# Patient Record
Sex: FEMALE
[De-identification: names, ages, dates, MRNs, and addresses within clinical notes are randomized per-mention and may not be internally consistent; named-entity substitution may affect disease eponyms.]

---

## 2018-09-03 ENCOUNTER — HOSPITAL ENCOUNTER (EMERGENCY)
Dept: HOSPITAL 5 - ED | Age: 43
Discharge: HOME | End: 2018-09-03
Payer: MEDICARE

## 2018-09-03 VITALS — DIASTOLIC BLOOD PRESSURE: 50 MMHG | SYSTOLIC BLOOD PRESSURE: 102 MMHG

## 2018-09-03 DIAGNOSIS — Z90.89: ICD-10-CM
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
## 2018-09-03 DIAGNOSIS — F20.9: ICD-10-CM

## 2018-09-03 DIAGNOSIS — Z88.8: ICD-10-CM

## 2018-09-03 DIAGNOSIS — F43.10: ICD-10-CM

## 2018-09-03 DIAGNOSIS — L08.89: ICD-10-CM

## 2018-09-03 DIAGNOSIS — T63.301A: Primary | ICD-10-CM

## 2018-09-03 DIAGNOSIS — F31.9: ICD-10-CM

## 2018-09-03 PROCEDURE — 99282 EMERGENCY DEPT VISIT SF MDM: CPT

## 2018-09-03 NOTE — EMERGENCY DEPARTMENT REPORT
- General


Chief complaint: Wound/Laceration


Stated complaint: SPIDER BITE


Time Seen by Provider: 09/03/18 15:27


Source: patient


Mode of arrival: Ambulatory


Limitations: No Limitations





- History of Present Illness


Initial comments: 





43-year-old female with a past medical history of bipolar disorder, 

schizoaffective disorder, and PTSD presents from [facility] for a wound to her 

distal forearm proximal to the wrist.  Patient states that the wound has been 

there for several weeks initially started out with bite wounds and a small 

lump.  He progressed and increased in size.  Patient has been expressing pus 

from the wound.  She complains of intermittent chills without documented fever.

  She has had intermittent nausea vomiting without any vomiting today.  No 

reports of abdominal pain.  She has been placing adhesive bandages over the 

area which have been partially removing skin when she was change the bandage 

because it does did not hear portion of the finish was not big enough to cover 

the whole wound.  Patient states she has received tetanus 4 years ago.  She has 

not been by a physician since wound has been present.  Patient states she has 

not currently receiving psychiatric treatment and she is cyst renting a room at 

[place] because it is affordable





- Related Data


 Previous Rx's











 Medication  Instructions  Recorded  Last Taken  Type


 


HYDROcodone/APAP 5-325 [[place] 1 each PO ONCE #15 tablet 09/03/18 Unknown Rx





5-325 mg TAB]    


 


Ibuprofen [Motrin 800 MG tab] 800 mg PO ONCE #30 tablet 09/03/18 Unknown Rx


 


Ondansetron [Zofran Odt] 4 mg PO Q8HR PRN #20 tab.rapdis 09/03/18 Unknown Rx


 


Sulfamethoxazole/Trimethoprim 1 each PO ONCE #20 tablet 09/03/18 Unknown Rx





[Bactrim DS TAB]    











 Allergies











Allergy/AdvReac Type Severity Reaction Status Date / Time


 


lamotrigine [From Lamictal] Allergy  Itching Verified 09/03/18 14:16


 


oxcarbazepine Allergy  Itching Verified 09/03/18 14:16





[From Trileptal]     














Abscess Boil HPI





- HPI


Chief Complaint: Wound/Laceration


Stated Complaint: SPIDER BITE


Time Seen by Provider: 09/03/18 15:27


Home Medications: 


 Previous Rx's











 Medication  Instructions  Recorded  Last Taken  Type


 


HYDROcodone/APAP 5-325 [[place] 1 each PO ONCE #15 tablet 09/03/18 Unknown Rx





5-325 mg TAB]    


 


Ibuprofen [Motrin 800 MG tab] 800 mg PO ONCE #30 tablet 09/03/18 Unknown Rx


 


Ondansetron [Zofran Odt] 4 mg PO Q8HR PRN #20 tab.rapdis 09/03/18 Unknown Rx


 


Sulfamethoxazole/Trimethoprim 1 each PO ONCE #20 tablet 09/03/18 Unknown Rx





[Bactrim DS TAB]    











Allergies/Adverse Reactions: 


 Allergies











Allergy/AdvReac Type Severity Reaction Status Date / Time


 


lamotrigine [From Lamictal] Allergy  Itching Verified 09/03/18 14:16


 


oxcarbazepine Allergy  Itching Verified 09/03/18 14:16





[From Trileptal]     














ED Review of Systems


ROS: 


Stated complaint: SPIDER BITE


Other details as noted in HPI





Comment: All other systems reviewed and negative





ED Past Medical Hx





- Past Medical History


Previous Medical History?: Yes


Hx Psychiatric Treatment: Yes (Bi polar, schizo affective disorder, PTSD.)





- Surgical History


Past Surgical History?: Yes


Additional Surgical History: C section, tonsilectomy





- Social History


Smoking Status: Never Smoker


Substance Use Type: None





- Medications


Home Medications: 


 Home Medications











 Medication  Instructions  Recorded  Confirmed  Last Taken  Type


 


HYDROcodone/APAP 5-325 [[place] 1 each PO ONCE #15 tablet 09/03/18  Unknown Rx





5-325 mg TAB]     


 


Ibuprofen [Motrin 800 MG tab] 800 mg PO ONCE #30 tablet 09/03/18  Unknown Rx


 


Ondansetron [Zofran Odt] 4 mg PO Q8HR PRN #20 tab.rapdis 09/03/18  Unknown Rx


 


Sulfamethoxazole/Trimethoprim 1 each PO ONCE #20 tablet 09/03/18  Unknown Rx





[Bactrim DS TAB]     














ED Physical Exam





- General


Limitations: No Limitations





- Skin


Skin exam: Present: cyanosis





- Other


Other exam information: 





General: No limitations, patient is alert in no acute distress


Head exam: Atraumatic, normocephalic


Eyes exam: Normal appearance


ENT: Moist mucous membrane, normal oropharynx


Neck exam: Normal inspection, full range of motion


Respiratory exam: Clear to auscultation bilateral, no wheezes, rales, crackles


Cardiovascular: Normal rate and rhythm, normal heart sounds


Abdomen: Soft, nondistended, and  nontender, with normal bowel sounds, no 

rebound, or guarding


Extremity: Full range of motion normal inspection no deformity


Back: Normal Inspection, full range of motion, no tenderness


Neurologic: Alert, oriented x3, cranial nerves intact, no motor or sensory 

deficit


Psychiatric: normal affect, normal mood


Skin: left distal forearn skin 4cm circular infected superficial wound.  Very 

minimal area of necrosis. mild bloody drainage without pus, erythema at the 

board is due to skin irritation versus cellulitis.  Positive warmth. pt also 

has a sore superficial skin sore to right jaw area





ED Course





 Vital Signs











  09/03/18 09/03/18





  14:10 15:06


 


Temperature 98.8 F 


 


Pulse Rate 87 


 


Respiratory 16 





Rate  


 


Blood Pressure 110/83 102/50


 


O2 Sat by Pulse 98 





Oximetry  














ED Medical Decision Making





- Medical Decision Making





Left forearm wound


No purulent drainage


Wound has been present for several weeks but not healing well


No current nausea or vomiting


Treated with Norco and Bactrim


Will be discharged home with treatment


Even if this is a black  spider bite it occurred several weeks ago and pt 

is not a candidate for antivenom at this time. She currently lacks systemic 

symptoms





- Differential Diagnosis


abscesses, cellulitis, black  bite


Critical Care Time: No


Critical care attestation.: 


If time is entered above; I have spent that time in minutes in the direct care 

of this critically ill patient, excluding procedure time.








ED Disposition


Clinical Impression: 


 Infected bite wound





Disposition: DC-01 TO HOME OR SELFCARE


Is pt being admited?: No


Does the pt Need Aspirin: No


Condition: Stable


Instructions:  Wound Infection (ED)


Additional Instructions: 


Take the medication as prescribed.  Follow up with your doctor or the doctor 

provided.  Return if symptoms worsen as indicated by your discharge instructions


Prescriptions: 


HYDROcodone/APAP 5-325 [Norco 5-325 mg TAB] 1 each PO ONCE #15 tablet


Ibuprofen [Motrin 800 MG tab] 800 mg PO ONCE #30 tablet


Ondansetron [Zofran Odt] 4 mg PO Q8HR PRN #20 tab.rapdis


 PRN Reason: Nausea And Vomiting


Sulfamethoxazole/Trimethoprim [Bactrim DS TAB] 1 each PO ONCE #20 tablet


Referrals: 


[facility] [Provider Group] - 3-5 Days (primary care clinic)


JOÃO AYALA DO [Staff Physician] - 3-5 Days (primary care doctor)


Time of Disposition: 16:16

## 2019-02-20 ENCOUNTER — HOSPITAL ENCOUNTER (INPATIENT)
Dept: HOSPITAL 5 - ED | Age: 44
LOS: 4 days | Discharge: HOME | DRG: 603 | End: 2019-02-24
Attending: INTERNAL MEDICINE | Admitting: INTERNAL MEDICINE
Payer: MEDICARE

## 2019-02-20 DIAGNOSIS — F43.10: ICD-10-CM

## 2019-02-20 DIAGNOSIS — J45.909: ICD-10-CM

## 2019-02-20 DIAGNOSIS — L03.113: Primary | ICD-10-CM

## 2019-02-20 DIAGNOSIS — F25.0: ICD-10-CM

## 2019-02-20 DIAGNOSIS — L88: ICD-10-CM

## 2019-02-20 DIAGNOSIS — F17.200: ICD-10-CM

## 2019-02-20 LAB
ALBUMIN SERPL-MCNC: 3.7 G/DL (ref 3.9–5)
ALT SERPL-CCNC: 11 UNITS/L (ref 7–56)
BASOPHILS # (AUTO): 0 K/MM3 (ref 0–0.1)
BASOPHILS NFR BLD AUTO: 0.3 % (ref 0–1.8)
BUN SERPL-MCNC: 9 MG/DL (ref 7–17)
BUN/CREAT SERPL: 18 %
CALCIUM SERPL-MCNC: 9 MG/DL (ref 8.4–10.2)
EOSINOPHIL # BLD AUTO: 0 K/MM3 (ref 0–0.4)
EOSINOPHIL NFR BLD AUTO: 0.3 % (ref 0–4.3)
HCT VFR BLD CALC: 41.1 % (ref 30.3–42.9)
HEMOLYSIS INDEX: 13
HGB BLD-MCNC: 13.7 GM/DL (ref 10.1–14.3)
LYMPHOCYTES # BLD AUTO: 0.7 K/MM3 (ref 1.2–5.4)
LYMPHOCYTES NFR BLD AUTO: 7.2 % (ref 13.4–35)
MCHC RBC AUTO-ENTMCNC: 34 % (ref 30–34)
MCV RBC AUTO: 95 FL (ref 79–97)
MONOCYTES # (AUTO): 1.1 K/MM3 (ref 0–0.8)
MONOCYTES % (AUTO): 10.8 % (ref 0–7.3)
PLATELET # BLD: 219 K/MM3 (ref 140–440)
RBC # BLD AUTO: 4.35 M/MM3 (ref 3.65–5.03)

## 2019-02-20 PROCEDURE — 86334 IMMUNOFIX E-PHORESIS SERUM: CPT

## 2019-02-20 PROCEDURE — 84166 PROTEIN E-PHORESIS/URINE/CSF: CPT

## 2019-02-20 PROCEDURE — 85652 RBC SED RATE AUTOMATED: CPT

## 2019-02-20 PROCEDURE — 80048 BASIC METABOLIC PNL TOTAL CA: CPT

## 2019-02-20 PROCEDURE — 80053 COMPREHEN METABOLIC PANEL: CPT

## 2019-02-20 PROCEDURE — 80074 ACUTE HEPATITIS PANEL: CPT

## 2019-02-20 PROCEDURE — 82140 ASSAY OF AMMONIA: CPT

## 2019-02-20 PROCEDURE — 85025 COMPLETE CBC W/AUTO DIFF WBC: CPT

## 2019-02-20 PROCEDURE — 87040 BLOOD CULTURE FOR BACTERIA: CPT

## 2019-02-20 PROCEDURE — 86038 ANTINUCLEAR ANTIBODIES: CPT

## 2019-02-20 PROCEDURE — 80307 DRUG TEST PRSMV CHEM ANLYZR: CPT

## 2019-02-20 PROCEDURE — 84703 CHORIONIC GONADOTROPIN ASSAY: CPT

## 2019-02-20 PROCEDURE — 96365 THER/PROPH/DIAG IV INF INIT: CPT

## 2019-02-20 PROCEDURE — 87806 HIV AG W/HIV1&2 ANTB W/OPTIC: CPT

## 2019-02-20 PROCEDURE — 86021 WBC ANTIBODY IDENTIFICATION: CPT

## 2019-02-20 PROCEDURE — 96375 TX/PRO/DX INJ NEW DRUG ADDON: CPT

## 2019-02-20 PROCEDURE — 84165 PROTEIN E-PHORESIS SERUM: CPT

## 2019-02-20 PROCEDURE — 86160 COMPLEMENT ANTIGEN: CPT

## 2019-02-20 PROCEDURE — 36415 COLL VENOUS BLD VENIPUNCTURE: CPT

## 2019-02-20 PROCEDURE — 87116 MYCOBACTERIA CULTURE: CPT

## 2019-02-20 PROCEDURE — 86140 C-REACTIVE PROTEIN: CPT

## 2019-02-20 PROCEDURE — 96376 TX/PRO/DX INJ SAME DRUG ADON: CPT

## 2019-02-20 PROCEDURE — 85613 RUSSELL VIPER VENOM DILUTED: CPT

## 2019-02-20 NOTE — CAT SCAN REPORT
FINAL REPORT



PROCEDURE:  CT UPPER EXTREM RT W CON



TECHNIQUE:  Computerized axial tomography was performed of the RIGHT hand after the IV injection of i
odinated nonionic contrast. 



HISTORY:  Hand swelling 



COMPARISON:  No prior studies are available for comparison.



FINDINGS:  

There is diffuse dorsal subcutaneous edema. No organized fluid collection is seen. No enhancing mass 
is identified. There is limited evaluation of the extensor tendons. No fracture is seen. No focal oss
eous lesions are identified 



IMPRESSION:  

No acute osseous abnormality is seen.



There is diffuse dorsal soft tissue swelling which is nonspecific but could be related to cellulitis.
 No organized fluid collection is seen.

## 2019-02-20 NOTE — XRAY REPORT
XRAY RIGHT HAND THREE VIEWS: 02/20/19 11:38:00





CLINICAL: Trauma and pain.



FINDINGS: The bones and joint spaces are normal.  No fracture or 

dislocation.  Marked soft tissue edema of the dorsum of the hand.  No 

foreign body or soft tissue air.  The carpal bones are intact and the 

distal radius and ulna are intact.



IMPRESSION: Marked soft tissue edema but no fracture or dislocation.

## 2019-02-20 NOTE — HISTORY AND PHYSICAL REPORT
History of Present Illness


Chief complaint: 





My hand is red


History of present illness: 


43 YO Female with Bipolar, Schizoaffective Disorder, Nicotine Dependence 

presents to ED for evaluation. Pt states that she has experienced wounds all 

over her body over the past several years, with worsening symptoms over the past

2 weeks. Pt also reports redness and drainage from the wound on her right hand. 

Pt also reports similar symptoms from the lesions on her right shoulder, right 

forearm, and right eyebrow. Pt transported to [facility] by family for further care and

evaluation. Pt seen and evaluated in ED and found to have R hand Cellulitis, as 

well as suspected Pyoderma Gangrenosum. Pt admitted to medical floor and 

initiated on IV antibiotic therapy. 








Past History


Past Medical History: other (Bipolar, Schizophrenia)


Past Surgical History: , tonsillectomy


Social history: smoking


Family history: no significant family history (reviewed)





Medications and Allergies


                                    Allergies











Allergy/AdvReac Type Severity Reaction Status Date / Time


 


lamotrigine [From Lamictal] Allergy  Itching Verified 18 14:16


 


oxcarbazepine Allergy  Itching Verified 18 14:16





[From Trileptal]     











                                Home Medications











 Medication  Instructions  Recorded  Confirmed  Last Taken  Type


 


No Known Home Medications [No  19 Unknown History





Reported Home Medications]     














Review of Systems


Constitutional: no weight loss, no weight gain, no fever, no chills


Ears, nose, mouth and throat: no ear pain, no ear discharge, no tinnitis, no 

decreased hearing, no nose pain


Breasts: no change in shape, no swelling, no mass


Cardiovascular: no chest pain, no orthopnea, no palpitations, no rapid/irregular

heart beat, no edema


Respiratory: no cough, no cough with sputum, no excessive sputum, no hemoptysis


Gastrointestinal: no abdominal pain, no nausea, no vomiting, no diarrhea


Genitourinary Female: no dysmenorrhea, no pelvic pain, no flank pain, no 

menorrhagia, no dysuria, no urinary frequency


Rectal: no pain, no incontinence, no bleeding


Musculoskeletal: no neck stiffness, no neck pain, no shooting arm pain, no arm 

numbness/tingling, no low back pain


Integumentary: redness, sores, lesions, no jaundice, no boils, no growths, no 

bullae


Neurological: no transient paralysis, no paralysis, no weakness, no parathesias,

no numbness, no tingling


Psychiatric: no anxiety, no memory loss, no change in sleep habits, no sleep 

disturbances, no insomnia


Endocrine: no cold intolerance, no heat intolerance, no polyphagia, no 

polydipsia, no polyuria, no nocturia


Hematologic/Lymphatic: no easy bruising, no easy bleeding


Allergic/Immunologic: no urticaria, no allergic rhinitis, no wheezing





Exam





- Constitutional


Vitals: 


                                        











Temp Pulse Resp BP Pulse Ox


 


 98.8 F   89   19   103/55   98 


 


 19 19:35  19 19:35  19 19:35  19 19:35  19 19:35











General appearance: Present: mild distress





- EENT


Eyes: Present: PERRL


ENT: hearing intact, clear oral mucosa





- Neck


Neck: Present: supple, normal ROM





- Respiratory


Respiratory effort: normal


Respiratory: bilateral: CTA





- Cardiovascular


Heart Sounds: Present: S1 & S2.  Absent: rub, click





- Extremities


Extremities: pulses symmetrical, No edema


Peripheral Pulses: within normal limits





- Abdominal


General gastrointestinal: Present: soft, non-tender, non-distended, normal bowel

sounds


Female genitourinary: Present: normal





- Integumentary


Integumentary: Present: clear, warm, erythema





- Musculoskeletal


Musculoskeletal: gait normal, strength equal bilaterally





- Psychiatric


Psychiatric: appropriate mood/affect, intact judgment & insight





- Neurologic


Neurologic: CNII-XII intact, moves all extremities





Results





- Labs


CBC & Chem 7: 


                                 19 12:02





                                 19 12:02


Labs: 


                              Abnormal lab results











  19 Range/Units





  12:02 12:02 


 


Lymph % (Auto)  7.2 L   (13.4-35.0)  %


 


Mono % (Auto)  10.8 H   (0.0-7.3)  %


 


Lymph #  0.7 L   (1.2-5.4)  K/mm3


 


Mono #  1.1 H   (0.0-0.8)  K/mm3


 


Seg Neutrophils %  81.4 H   (40.0-70.0)  %


 


Seg Neutrophils #  8.1 H   (1.8-7.7)  K/mm3


 


Creatinine   0.5 L  (0.7-1.2)  mg/dL


 


Albumin   3.7 L  (3.9-5)  g/dL














Assessment and Plan





- Patient Problems


(1) Cellulitis of hand, right


Current Visit: Yes   Status: Acute   


Plan to address problem: 


IV antibiotic therapy, Xray right Hand, CT Right hand, wound care consulted. 








(2) Bipolar disorder


Current Visit: Yes   Status: Acute   





(3) Pyoderma gangrenosum


Current Visit: Yes   Status: Acute   


Plan to address problem: 


ESR, CRP, ANCA, hepatitis panel, Serum/urine Protein Electrophoresis, Topical 

Triamcinolone BID








(4) DVT prophylaxis


Current Visit: Yes   Status: Acute   


Plan to address problem: 


SCD to BLE while in bed.

## 2019-02-20 NOTE — EMERGENCY DEPARTMENT REPORT
Addendum entered and electronically signed by PANFILO PRECIADO NP  02/20/19 

12:38: 








Blank Doc





- Documentation


Documentation: 





Patient can go to [facility].  No fever. Normal CBC. 





Original Note:








Blank Doc





- Documentation


Documentation: 





This is a 44-year-old female that presents with right hand cellulitis.  Patient 

stated started with right eyebrow and left arm.  Stated was bitten by spider to 

left arm and now right arm has swelling with cellulitis.  





This initial assessment diagnostic orders/clinical plan/treatment(s) is/are 

subject to change based on patient's health status, clinical progression and 

re-assessment by fellow clinical providers in the ED.  Further treatment and 

workup at subsequent clinical providers discretion.  Patient/guardians urged not

to elope from ED s their condition may be serious if not clinically assessed and

managed.  Initial orders include:


1-Patient sent to Main ED for further evaluation and treatment


2-Labs
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
## 2019-02-21 RX ADMIN — OXYCODONE AND ACETAMINOPHEN PRN TAB: 5; 325 TABLET ORAL at 22:34

## 2019-02-21 RX ADMIN — TRIAMCINOLONE ACETONIDE SCH APPLIC: 1 CREAM TOPICAL at 10:08

## 2019-02-21 RX ADMIN — VANCOMYCIN HYDROCHLORIDE SCH MLS/HR: 1 INJECTION, POWDER, LYOPHILIZED, FOR SOLUTION INTRAVENOUS at 02:10

## 2019-02-21 RX ADMIN — TRIAMCINOLONE ACETONIDE SCH: 1 CREAM TOPICAL at 00:03

## 2019-02-21 RX ADMIN — OXYCODONE AND ACETAMINOPHEN PRN TAB: 5; 325 TABLET ORAL at 00:02

## 2019-02-21 RX ADMIN — Medication SCH ML: at 00:03

## 2019-02-21 RX ADMIN — OXYCODONE AND ACETAMINOPHEN PRN TAB: 5; 325 TABLET ORAL at 17:19

## 2019-02-21 RX ADMIN — OXYCODONE AND ACETAMINOPHEN PRN TAB: 5; 325 TABLET ORAL at 08:40

## 2019-02-21 RX ADMIN — Medication SCH ML: at 10:09

## 2019-02-21 RX ADMIN — VANCOMYCIN HYDROCHLORIDE SCH MLS/HR: 1 INJECTION, POWDER, LYOPHILIZED, FOR SOLUTION INTRAVENOUS at 13:52

## 2019-02-21 NOTE — PROGRESS NOTE
Assessment and Plan


Assessment and plan: 





Right hand cellulitis.  Continue IV antibiotics.  Wound care consultation.  CT 

scan reveals diffuse dorsal subcutaneous edema representative of cellulitis.  No

abscess.  ID consultation.





?  Pyoderma gangrenosum.  Follow-up ESR, CRP, ANCA, hepatitis panel, Serum/urine

Protein Electrophoresis, 





Bipolar disorder.  Continue medications.





DVT prophylaxis. SCD to BLE while in bed. 








History


Interval history: 





45 YO Female with Bipolar, Schizoaffective Disorder, Nicotine Dependence 

[facility] to [facility] with diagnosis of right hand/forearm 

cellulitis.  Pt states that she has experienced wounds all over her body over 

the past several years, with worsening symptoms over the past 2 weeks.


No new issues overnight.





Hospitalist Physical





- Constitutional


Vitals: 


                                        











Temp Pulse Resp BP Pulse Ox


 


 98.0 F   75   18   92/49   98 


 


 02/21/19 06:21  02/21/19 06:21  02/21/19 06:21  02/21/19 06:21  02/21/19 10:24











General appearance: Present: mild distress





- EENT


Eyes: Present: PERRL, EOM intact


ENT: hearing intact, clear oral mucosa, dentition normal





- Neck


Neck: Present: supple, normal ROM





- Respiratory


Respiratory effort: normal


Respiratory: bilateral: CTA





- Cardiovascular


Rhythm: regular


Heart Sounds: Present: S1 & S2.  Absent: gallop, rub





- Extremities


Extremities: no ischemia, Full ROM, abnormal (right forearm erythema with 

quarter size open wound lateral wrist)





- Abdominal


General gastrointestinal: soft, non-tender, non-distended, normal bowel sounds





- Integumentary


Integumentary: Present: clear, warm, dry





- Neurologic


Neurologic: CNII-XII intact, moves all extremities





Results





- Labs


CBC & Chem 7: 


                                 02/20/19 12:02





                                 02/20/19 12:02


Labs: 


                             Laboratory Last Values











WBC  9.9 K/mm3 (4.5-11.0)   02/20/19  12:02    


 


RBC  4.35 M/mm3 (3.65-5.03)   02/20/19  12:02    


 


Hgb  13.7 gm/dl (10.1-14.3)   02/20/19  12:02    


 


Hct  41.1 % (30.3-42.9)   02/20/19  12:02    


 


MCV  95 fl (79-97)   02/20/19  12:02    


 


MCH  32 pg (28-32)   02/20/19  12:02    


 


MCHC  34 % (30-34)   02/20/19  12:02    


 


RDW  13.5 % (13.2-15.2)   02/20/19  12:02    


 


Plt Count  219 K/mm3 (140-440)   02/20/19  12:02    


 


Lymph % (Auto)  7.2 % (13.4-35.0)  L  02/20/19  12:02    


 


Mono % (Auto)  10.8 % (0.0-7.3)  H  02/20/19  12:02    


 


Eos % (Auto)  0.3 % (0.0-4.3)   02/20/19  12:02    


 


Baso % (Auto)  0.3 % (0.0-1.8)   02/20/19  12:02    


 


Lymph #  0.7 K/mm3 (1.2-5.4)  L  02/20/19  12:02    


 


Mono #  1.1 K/mm3 (0.0-0.8)  H  02/20/19  12:02    


 


Eos #  0.0 K/mm3 (0.0-0.4)   02/20/19  12:02    


 


Baso #  0.0 K/mm3 (0.0-0.1)   02/20/19  12:02    


 


Seg Neutrophils %  81.4 % (40.0-70.0)  H  02/20/19  12:02    


 


Seg Neutrophils #  8.1 K/mm3 (1.8-7.7)  H  02/20/19  12:02    


 


ESR  38 mm/Hr (0-20)   02/20/19  21:26    


 


Sodium  137 mmol/L (137-145)   02/20/19  12:02    


 


Potassium  3.6 mmol/L (3.6-5.0)   02/20/19  12:02    


 


Chloride  98.9 mmol/L ()   02/20/19  12:02    


 


Carbon Dioxide  26 mmol/L (22-30)   02/20/19  12:02    


 


Anion Gap  16 mmol/L  02/20/19  12:02    


 


BUN  9 mg/dL (7-17)   02/20/19  12:02    


 


Creatinine  0.5 mg/dL (0.7-1.2)  L  02/20/19  12:02    


 


Estimated GFR  > 60 ml/min  02/20/19  12:02    


 


BUN/Creatinine Ratio  18 %  02/20/19  12:02    


 


Glucose  93 mg/dL ()   02/20/19  12:02    


 


Lactic Acid  1.30 mmol/L (0.7-2.0)   02/20/19  13:46    


 


Calcium  9.0 mg/dL (8.4-10.2)   02/20/19  12:02    


 


Total Bilirubin  0.80 mg/dL (0.1-1.2)   02/20/19  12:02    


 


AST  13 units/L (5-40)   02/20/19  12:02    


 


ALT  11 units/L (7-56)   02/20/19  12:02    


 


Alkaline Phosphatase  68 units/L ()   02/20/19  12:02    


 


C-Reactive Protein  14.90 mg/dL (0.00-1.30)  H  02/20/19  21:35    


 


Total Protein  7.1 g/dL (6.3-8.2)   02/20/19  12:02    


 


Albumin  3.7 g/dL (3.9-5)  L  02/20/19  12:02    


 


Albumin/Globulin Ratio  1.1 %  02/20/19  12:02    


 


HCG, Qual  Negative  (Negative)   02/20/19  12:02    


 


Hepatitis A IgM Ab  Non-reactive  (NonReactive)   02/20/19  21:34    


 


Hep Bs Antigen  Non-reactive  (Negative)   02/20/19  21:34    


 


Hep B Core IgM Ab  Non-reactive  (NonReactive)   02/20/19  21:34    


 


Hepatitis C Antibody  Non-reactive  (NonReactive)   02/20/19  21:34

## 2019-02-21 NOTE — CONSULTATION
History of Present Illness





- Reason for Consult


Consult date: 19


Right hand cellulitis, ?pyoderma


Requesting physician: YAIR RITTER





- History of Present Illness


The patient is a 44-year-old female with schizoaffective disorder, bipolar 

disorder who presented to the emergency room yesterday with complaints of 

multiple wounds including right hand swelling and pain. About 2 weeks ago, she 

developed a wound around her right eyebrow apparently some drainage, she a

ttributes this to her shaving her eyebrows, then later, she developed redness, 

pain and swelling of her right hand that progressively got worse and was 

associated with some fevers and chills and night sweats. She also reports 

additional wounds including her left forearm following a similar timeline. 

Apparently, she had a spider bite to her left forearm several months ago 

resulting in a wound that but had completely healed. Otherwise she denies any 

other systemic complaints. She received a dose of Zosyn and vancomycin in the 

emergency room and now has been continued on vancomycin. Infectious diseases was

consulted for additional recommendations.





She is disabled. Denies any smoking or IV drug use. He is smoked some marijuana 

in the past. Denies any alcohol use. She denies any physical abuse or trauma. 

She previously used to [place] [facility], and about 4 days ago she moved to a town 

house where she is renting one room. Apparently, she's had spider bites before 

and has seen cockroaches and bedbugs in the hotel where she used to live.





Review of Systems: 


General: no fevers,chills or rigors here


HEENT: no new visual disturbance


Respiratory: No cough, sputum, hemoptysis or shortness of breath


Cardiovascular: No chest pain, syncope


Gastrointestinal: No nausea, vomiting or diarrhea


Genitourinary: No dysuria or hematuria


Musculoskeletal: No new or worsening neck pain or back pain 


Neurologic: No headaches, seizures


Hematologic: No easy bruising or bleeding


Endocrine: No night sweats or acute weight loss


Skin: negative for rash, jaundice


Psychiatric: No suicidal or homicidal ideation








Past History


Past Medical History: other (Bipolar, Schizophrenia)


Past Surgical History: , tonsillectomy


Social history: smoking


Family history: no significant family history (reviewed)





Medications and Allergies


                                    Allergies











Allergy/AdvReac Type Severity Reaction Status Date / Time


 


lamotrigine [From Lamictal] Allergy  Itching Verified 18 14:16


 


oxcarbazepine Allergy  Itching Verified 18 14:16





[From Trileptal]     











                                Home Medications











 Medication  Instructions  Recorded  Confirmed  Last Taken  Type


 


No Known Home Medications [No  19 Unknown History





Reported Home Medications]     











Active Meds: 


Active Medications





Acetaminophen (Tylenol)  650 mg PO Q4H PRN


   PRN Reason: Pain MILD(1-3)/Fever >100.5/HA


Vancomycin HCl (Vancomycin/Ns 1 Gm/250 Ml)  1 gm in 250 mls @ 166.667 mls/hr IV 

Q12H [facility]


   Last Admin: 19 13:52 Dose:  166.667 mls/hr


   Documented by: 


Ondansetron HCl (Zofran)  4 mg IV Q8H PRN


   PRN Reason: Nausea And Vomiting


Oxycodone/Acetaminophen (Percocet 5/325)  1 tab PO Q4H PRN


   PRN Reason: Pain, Moderate (4-6)


   Last Admin: 19 08:40 Dose:  1 tab


   Documented by: 


Sodium Chloride (Sodium Chloride Flush Syringe 10 Ml)  10 ml IV BID [facility]


   Last Admin: 19 10:09 Dose:  10 ml


   Documented by: 


Sodium Chloride (Sodium Chloride Flush Syringe 10 Ml)  10 ml IV PRN PRN


   PRN Reason: LINE FLUSH


Triamcinolone Acetonide (Kenalog)  1 applic TP BID [facility]


   Last Admin: 19 10:08 Dose:  1 applic


   Documented by: 











Physical Examination





- Physical Exam


Narrative exam: 








Physical Exam: 


Constitutional: Alert, cooperative. No acute distress


Head, Ears, Nose: Normocephalic, atraumatic. External ears, nose normal


Eyes: Conjunctivae/corneas clear. No icterus. No ptosis.


Neck: Supple, no meningeal signs


Oral: dentition fair, no thrush


Cardiovascular: S1, S2 normal. 


Respiratory: Good air entry, clear to auscultation bilaterally


GI: Soft, non-tender; bowel sounds normal. No peritoneal signs


Musculoskeletal: No pedal edema, no cyanosis.


Skin: Right eyebrow about 1.5 cm superficial wound without any purulence or 

drainage. There is no warmth or tenderness. Right arm with superficial abrasion 

on again without purulence or drainage or acute inflammation. Similar lesion 

also on the right shoulder blade area. Left forearm with superficial wound with 

clean base with no surrounding redness or tenderness. Right hand dorsal surface 

with a wound with a central eschar and surrounding redness, warmth and 

tenderness. No active drainage or purulence, no areas of fluctuation.


Hem/Lymphatic: No palpable cervical or supraclavicular nodes. No lymphangitis


Psych: Mood ok. Affect normal


Neurological: Awake, alert, oriented. No gross abnormality





- Constitutional


Vitals: 


                                   Vital Signs











Temp Pulse Resp BP Pulse Ox


 


 99.3 F   80   18   90/50   97 


 


 19 12:50  19 12:50  19 12:50  19 12:50  19 12:50








                           Temperature -Last 24 Hours











Temperature                    99.3 F


 


Temperature                    98.0 F


 


Temperature                    98.6 F


 


Temperature                    98.8 F


 


Temperature                    98.7 F

















Results





- Labs


CBC & Chem 7: 


                                 19 12:02





                                 19 12:02


Labs: 


                              Abnormal lab results











  19 Range/Units





  21:35 


 


C-Reactive Protein  14.90 H  (0.00-1.30)  mg/dL








Imaging: personally reviewed:


CT of the right hand with IV contrast showed soft tissue swelling consistent 

with cellulitis, no underlying abscess or bony abnormality.








Assessment and Plan








Cultures:


2019 blood culture: No growth





A/P:


44-year-old female with schizoaffective disorder, bipolar disorder admitted 

with:





1) Right hand cellulitis: Right hand does appear warm, swollen and tender and 

seems to be responding to IV antibiotics. Continue IV vancomycin for now.





2) Multiple other wounds involving right eyebrow, right arm, right shoulder 

blade, left forearm: These are not warm, tender. There is no purulence or 

drainage. These do not appear to be infected. Right eyebrow wound seemingly was 

associated with eyebrow shaving hence raises the concern for possible pathergy 

which is associated with pyoderma. Agree with checking for vasculitis and 

autoimmune etiology. Also check hepatitis B and C. Urine tox screen ordered. 

Continue wound care.





Recs:


- Continue IV Vancomycin, target trough: 10-20 mcg/ml


- f/u hepatitis panel, vasculitic panel


- urinary tox screen ordered


- continue wound care


- HIV ordered





[facility] 


C: [phone]


O: [phone]


F: [phone]

## 2019-02-22 LAB
BASOPHILS # (AUTO): 0 K/MM3 (ref 0–0.1)
BASOPHILS NFR BLD AUTO: 1.1 % (ref 0–1.8)
BENZODIAZEPINES SCREEN,URINE: (no result)
BUN SERPL-MCNC: 9 MG/DL (ref 7–17)
BUN/CREAT SERPL: 23 %
CALCIUM SERPL-MCNC: 7.8 MG/DL (ref 8.4–10.2)
EOSINOPHIL # BLD AUTO: 0.1 K/MM3 (ref 0–0.4)
EOSINOPHIL NFR BLD AUTO: 3.4 % (ref 0–4.3)
HCT VFR BLD CALC: 31.5 % (ref 30.3–42.9)
HEMOLYSIS INDEX: 3
HGB BLD-MCNC: 10.9 GM/DL (ref 10.1–14.3)
LYMPHOCYTES # BLD AUTO: 1.2 K/MM3 (ref 1.2–5.4)
LYMPHOCYTES NFR BLD AUTO: 31.4 % (ref 13.4–35)
MCHC RBC AUTO-ENTMCNC: 35 % (ref 30–34)
MCV RBC AUTO: 93 FL (ref 79–97)
METHADONE SCREEN,URINE: (no result)
MONOCYTES # (AUTO): 0.4 K/MM3 (ref 0–0.8)
MONOCYTES % (AUTO): 11.8 % (ref 0–7.3)
OPIATE SCREEN,URINE: (no result)
PLATELET # BLD: 207 K/MM3 (ref 140–440)
RBC # BLD AUTO: 3.38 M/MM3 (ref 3.65–5.03)

## 2019-02-22 RX ADMIN — OXYCODONE AND ACETAMINOPHEN PRN TAB: 5; 325 TABLET ORAL at 13:30

## 2019-02-22 RX ADMIN — VANCOMYCIN HYDROCHLORIDE SCH MLS/HR: 1 INJECTION, POWDER, LYOPHILIZED, FOR SOLUTION INTRAVENOUS at 03:47

## 2019-02-22 RX ADMIN — TRIAMCINOLONE ACETONIDE SCH: 1 CREAM TOPICAL at 21:38

## 2019-02-22 RX ADMIN — OXYCODONE AND ACETAMINOPHEN PRN TAB: 5; 325 TABLET ORAL at 21:34

## 2019-02-22 RX ADMIN — VANCOMYCIN HYDROCHLORIDE SCH MLS/HR: 1 INJECTION, POWDER, LYOPHILIZED, FOR SOLUTION INTRAVENOUS at 13:31

## 2019-02-22 RX ADMIN — Medication SCH ML: at 21:33

## 2019-02-22 RX ADMIN — Medication SCH: at 00:27

## 2019-02-22 RX ADMIN — TRIAMCINOLONE ACETONIDE SCH: 1 CREAM TOPICAL at 10:03

## 2019-02-22 RX ADMIN — TRIAMCINOLONE ACETONIDE SCH: 1 CREAM TOPICAL at 00:27

## 2019-02-22 RX ADMIN — Medication SCH ML: at 10:04

## 2019-02-22 RX ADMIN — OXYCODONE AND ACETAMINOPHEN PRN TAB: 5; 325 TABLET ORAL at 04:59

## 2019-02-22 NOTE — PROGRESS NOTE
Assessment and Plan


Assessment and plan: 





Right hand cellulitis.  Continue IV antibiotics.  Wound care.  CT scan reveals 

diffuse dorsal subcutaneous edema representative of cellulitis.  No abscess.  ID

following.





?  Pyoderma gangrenosum.  Follow-up ESR, CRP, ANCA, hepatitis panel, Serum/urine

Protein Electrophoresis, Check Hep B and C





Bipolar disorder.  Continue medications.





DVT prophylaxis. SCD to BLE while in bed. 








History


Interval history: 





45 YO Female with Bipolar, Schizoaffective Disorder, Nicotine Dependence 

[facility] to [facility] with diagnosis of right hand/forearm cellulitis

.  Pt states that she has experienced wounds all over her body over the past 

several years, with worsening symptoms over the past 2 weeks.


No new issues overnight.





Hospitalist Physical





- Constitutional


Vitals: 


                                        











Temp Pulse Resp BP Pulse Ox


 


 97.6 F   71   16   113/55   98 


 


 02/22/19 12:01  02/22/19 12:01  02/22/19 12:01  02/22/19 12:01  02/22/19 12:01











General appearance: Present: mild distress





- EENT


Eyes: Present: PERRL, EOM intact


ENT: hearing intact, clear oral mucosa, dentition normal





- Neck


Neck: Present: supple, normal ROM





- Respiratory


Respiratory effort: normal


Respiratory: bilateral: CTA





- Cardiovascular


Rhythm: regular


Heart Sounds: Present: S1 & S2.  Absent: gallop, rub





- Extremities


Extremities: no ischemia, No edema, Full ROM





- Abdominal


General gastrointestinal: soft, non-tender, non-distended, normal bowel sounds





- Integumentary


Integumentary: Present: clear, warm, dry





- Neurologic


Neurologic: CNII-XII intact, moves all extremities





Results





- Labs


CBC & Chem 7: 


                                 02/22/19 05:05





                                 02/22/19 05:05


Labs: 


                             Laboratory Last Values











WBC  3.7 K/mm3 (4.5-11.0)  L  02/22/19  05:05    


 


RBC  3.38 M/mm3 (3.65-5.03)  L  02/22/19  05:05    


 


Hgb  10.9 gm/dl (10.1-14.3)   02/22/19  05:05    


 


Hct  31.5 % (30.3-42.9)  D 02/22/19  05:05    


 


MCV  93 fl (79-97)   02/22/19  05:05    


 


MCH  32 pg (28-32)   02/22/19  05:05    


 


MCHC  35 % (30-34)  H  02/22/19  05:05    


 


RDW  13.6 % (13.2-15.2)   02/22/19  05:05    


 


Plt Count  207 K/mm3 (140-440)   02/22/19  05:05    


 


Lymph % (Auto)  31.4 % (13.4-35.0)   02/22/19  05:05    


 


Mono % (Auto)  11.8 % (0.0-7.3)  H  02/22/19  05:05    


 


Eos % (Auto)  3.4 % (0.0-4.3)   02/22/19  05:05    


 


Baso % (Auto)  1.1 % (0.0-1.8)   02/22/19  05:05    


 


Lymph #  1.2 K/mm3 (1.2-5.4)   02/22/19  05:05    


 


Mono #  0.4 K/mm3 (0.0-0.8)   02/22/19  05:05    


 


Eos #  0.1 K/mm3 (0.0-0.4)   02/22/19  05:05    


 


Baso #  0.0 K/mm3 (0.0-0.1)   02/22/19  05:05    


 


Seg Neutrophils %  52.3 % (40.0-70.0)   02/22/19  05:05    


 


Seg Neutrophils #  2.0 K/mm3 (1.8-7.7)   02/22/19  05:05    


 


ESR  38 mm/Hr (0-20)   02/20/19  21:26    


 


Sodium  142 mmol/L (137-145)   02/22/19  05:05    


 


Potassium  4.1 mmol/L (3.6-5.0)   02/22/19  05:05    


 


Chloride  107.1 mmol/L ()  H  02/22/19  05:05    


 


Carbon Dioxide  27 mmol/L (22-30)   02/22/19  05:05    


 


Anion Gap  12 mmol/L  02/22/19  05:05    


 


BUN  9 mg/dL (7-17)   02/22/19  05:05    


 


Creatinine  0.4 mg/dL (0.7-1.2)  L  02/22/19  05:05    


 


Estimated GFR  > 60 ml/min  02/22/19  05:05    


 


BUN/Creatinine Ratio  23 %  02/22/19  05:05    


 


Glucose  92 mg/dL ()   02/22/19  05:05    


 


Lactic Acid  1.30 mmol/L (0.7-2.0)   02/20/19  13:46    


 


Calcium  7.8 mg/dL (8.4-10.2)  L  02/22/19  05:05    


 


Total Bilirubin  0.80 mg/dL (0.1-1.2)   02/20/19  12:02    


 


AST  13 units/L (5-40)   02/20/19  12:02    


 


ALT  11 units/L (7-56)   02/20/19  12:02    


 


Alkaline Phosphatase  68 units/L ()   02/20/19  12:02    


 


C-Reactive Protein  14.90 mg/dL (0.00-1.30)  H  02/20/19  21:35    


 


Total Protein  7.1 g/dL (6.3-8.2)   02/20/19  12:02    


 


Albumin  3.7 g/dL (3.9-5)  L  02/20/19  12:02    


 


Albumin/Globulin Ratio  1.1 %  02/20/19  12:02    


 


HCG, Qual  Negative  (Negative)   02/20/19  12:02    


 


Hepatitis A IgM Ab  Non-reactive  (NonReactive)   02/20/19  21:34    


 


Hep Bs Antigen  Non-reactive  (Negative)   02/20/19  21:34    


 


Hep B Core IgM Ab  Non-reactive  (NonReactive)   02/20/19  21:34    


 


Hepatitis C Antibody  Non-reactive  (NonReactive)   02/20/19  21:34    


 


HIV 1&2 Antibody Rapid  Non react  (Non React)   02/22/19  05:05    


 


HIV P24 Antigen  Non react  (Non React)   02/22/19  05:05

## 2019-02-22 NOTE — PROGRESS NOTE
Assessment and Plan





Cultures:


02/20/2019 blood culture: No growth





A/P:


44-year-old female with schizoaffective disorder, bipolar disorder admitted 

with:





1) Right hand cellulitis: Right hand does appear warm, swollen and tender and 

seems to be responding to IV antibiotics. Continue IV vancomycin for now.


      -ESR 38


      -CRP 14.9





2) Multiple other wounds involving right eyebrow, right arm, right shoulder b

lade, left forearm: These are not warm, tender. There is no purulence or 

drainage. These do not appear to be infected. Right eyebrow wound seemingly was 

associated with eyebrow shaving hence raises the concern for possible pathergy 

which is associated with pyoderma. Agree with checking for vasculitis and 

autoimmune etiology. Also check hepatitis B and C. Urine tox screen ordered. 

Will also check SARAH, C3, C4.   Continue wound care.





Recs:


- Continue IV Vancomycin, target trough: 10-20 mcg/ml, D2


- f/u  vasculitic panel


- f/u urinary tox screen ordered


- continue wound care


-SARAH, C3 and C4 ordered








Dr. Limon will be on call this weekend, [phone]. Please call for questions.








[facility] 


M: [id number]


O:[phone]








Subjective


Date of service: 02/22/19


Interval history: 





Patient seen and examined.  Stated that her hand was feeling better today, no 

fevers.  Nurses notes, lab and reports reviewed, discussed with patient.. 





Objective





- Exam


Narrative Exam: 





Constitutional: Alert, cooperative. No acute distress


Head, Ears, Nose: Normocephalic, atraumatic. External ears, nose normal


Eyes: Conjunctivae/corneas clear. No icterus. No ptosis.


Neck: Supple, no meningeal signs


Oral: dentition fair, no thrush


Cardiovascular: S1, S2 normal. 


Respiratory: Good air entry, clear to auscultation bilaterally


GI: Soft, non-tender; bowel sounds normal. No peritoneal signs


Musculoskeletal: No pedal edema, no cyanosis.


Skin: Right eyebrow about 1.5 cm superficial wound without any purulence or 

drainage. There is no warmth or tenderness. Right arm with superficial abrasion 

on again without purulence or drainage or acute inflammation. Similar lesion 

also on the right shoulder blade area. Left forearm with superficial wound with 

clean base with no surrounding redness or tenderness. Right hand dorsal surface 

with a wound with a central eschar and surrounding redness, warmth and 

tenderness. No active drainage or purulence, no areas of fluctuation.


Hem/Lymphatic: No palpable cervical or supraclavicular nodes. No lymphangitis


Psych: Mood ok. Affect normal


Neurological: Awake, alert, oriented. No gross abnormalit





- Constitutional


Vitals: 


                                   Vital Signs











Temp Pulse Resp BP Pulse Ox


 


 97.5 F L  65   20   87/49   98 


 


 02/22/19 05:03  02/22/19 05:03  02/22/19 05:03  02/22/19 05:03  02/22/19 05:03








                           Temperature -Last 24 Hours











Temperature                    97.5 F


 


Temperature                    97.9 F


 


Temperature                    98.1 F


 


Temperature                    99.3 F

















- Labs


CBC & Chem 7: 


                                 02/22/19 05:05





                                 02/22/19 05:05


Labs: 


                              Abnormal lab results











  02/22/19 02/22/19 Range/Units





  05:05 05:05 


 


WBC  3.7 L   (4.5-11.0)  K/mm3


 


RBC  3.38 L   (3.65-5.03)  M/mm3


 


MCHC  35 H   (30-34)  %


 


Mono % (Auto)  11.8 H   (0.0-7.3)  %


 


Chloride   107.1 H  ()  mmol/L


 


Creatinine   0.4 L  (0.7-1.2)  mg/dL


 


Calcium   7.8 L  (8.4-10.2)  mg/dL

## 2019-02-23 RX ADMIN — TRIAMCINOLONE ACETONIDE SCH: 1 CREAM TOPICAL at 21:19

## 2019-02-23 RX ADMIN — OXYCODONE AND ACETAMINOPHEN PRN TAB: 5; 325 TABLET ORAL at 21:24

## 2019-02-23 RX ADMIN — TRIAMCINOLONE ACETONIDE SCH: 1 CREAM TOPICAL at 10:19

## 2019-02-23 RX ADMIN — POLYETHYLENE GLYCOL 3350 SCH GM: 17 POWDER, FOR SOLUTION ORAL at 14:14

## 2019-02-23 RX ADMIN — VANCOMYCIN HYDROCHLORIDE SCH MLS/HR: 1 INJECTION, POWDER, LYOPHILIZED, FOR SOLUTION INTRAVENOUS at 01:43

## 2019-02-23 RX ADMIN — VANCOMYCIN HYDROCHLORIDE SCH MLS/HR: 1 INJECTION, POWDER, LYOPHILIZED, FOR SOLUTION INTRAVENOUS at 14:22

## 2019-02-23 RX ADMIN — OXYCODONE AND ACETAMINOPHEN PRN TAB: 5; 325 TABLET ORAL at 14:15

## 2019-02-23 RX ADMIN — Medication SCH ML: at 21:18

## 2019-02-23 NOTE — PROGRESS NOTE
Assessment and Plan


Assessment and plan: 





Right hand cellulitis.  Continue IV antibiotics.  Wound care.  CT scan reveals 

diffuse dorsal subcutaneous edema representative of cellulitis.  No abscess.  ID

following.





?  Pyoderma gangrenosum.  Follow-up ESR, CRP, ANCA, hepatitis panel, Serum/urine

Protein Electrophoresis, Check Hep B and C





Bipolar disorder.  Continue medications.





DVT prophylaxis. SCD to BLE while in bed.





Disposition.  Home in am 








History


Interval history: 





45 YO Female with Bipolar, Schizoaffective Disorder, Nicotine Dependence 

admitted to the [facility] with diagnosis of right hand/forearm 

cellulitis.  Pt states that she has experienced wounds all over her body over 

the past several years, with worsening symptoms over the past 2 weeks.


No new issues overnight.





Hospitalist Physical





- Constitutional


Vitals: 


                                        











Temp Pulse Resp BP Pulse Ox


 


 97.8 F   81   24   101/41   98 


 


 02/23/19 04:41  02/23/19 04:41  02/23/19 04:41  02/23/19 04:41  02/23/19 04:41











General appearance: Present: mild distress





- EENT


Eyes: Present: PERRL, EOM intact


ENT: hearing intact, clear oral mucosa, dentition normal





- Neck


Neck: Present: supple, normal ROM





- Respiratory


Respiratory effort: normal


Respiratory: bilateral: CTA





- Cardiovascular


Rhythm: regular


Heart Sounds: Present: S1 & S2.  Absent: gallop, rub





- Extremities


Extremities: no ischemia, No edema, Full ROM





- Abdominal


General gastrointestinal: soft, non-tender, non-distended, normal bowel sounds





- Integumentary


Integumentary: Present: clear, warm, dry





- Neurologic


Neurologic: CNII-XII intact, moves all extremities





Results





- Labs


CBC & Chem 7: 


                                 02/22/19 05:05





                                 02/22/19 05:05


Labs: 


                             Laboratory Last Values











WBC  3.7 K/mm3 (4.5-11.0)  L  02/22/19  05:05    


 


RBC  3.38 M/mm3 (3.65-5.03)  L  02/22/19  05:05    


 


Hgb  10.9 gm/dl (10.1-14.3)   02/22/19  05:05    


 


Hct  31.5 % (30.3-42.9)  D 02/22/19  05:05    


 


MCV  93 fl (79-97)   02/22/19  05:05    


 


MCH  32 pg (28-32)   02/22/19  05:05    


 


MCHC  35 % (30-34)  H  02/22/19  05:05    


 


RDW  13.6 % (13.2-15.2)   02/22/19  05:05    


 


Plt Count  207 K/mm3 (140-440)   02/22/19  05:05    


 


Lymph % (Auto)  31.4 % (13.4-35.0)   02/22/19  05:05    


 


Mono % (Auto)  11.8 % (0.0-7.3)  H  02/22/19  05:05    


 


Eos % (Auto)  3.4 % (0.0-4.3)   02/22/19  05:05    


 


Baso % (Auto)  1.1 % (0.0-1.8)   02/22/19  05:05    


 


Lymph #  1.2 K/mm3 (1.2-5.4)   02/22/19  05:05    


 


Mono #  0.4 K/mm3 (0.0-0.8)   02/22/19  05:05    


 


Eos #  0.1 K/mm3 (0.0-0.4)   02/22/19  05:05    


 


Baso #  0.0 K/mm3 (0.0-0.1)   02/22/19  05:05    


 


Seg Neutrophils %  52.3 % (40.0-70.0)   02/22/19  05:05    


 


Seg Neutrophils #  2.0 K/mm3 (1.8-7.7)   02/22/19  05:05    


 


ESR  38 mm/Hr (0-20)   02/20/19  21:26    


 


Lupus Anticoagulant  see below   02/20/19  21:26    


 


LA PTT Baseline  41 sec (<=40)  H  02/20/19  21:26    


 


Sodium  142 mmol/L (137-145)   02/22/19  05:05    


 


Potassium  4.1 mmol/L (3.6-5.0)   02/22/19  05:05    


 


Chloride  107.1 mmol/L ()  H  02/22/19  05:05    


 


Carbon Dioxide  27 mmol/L (22-30)   02/22/19  05:05    


 


Anion Gap  12 mmol/L  02/22/19  05:05    


 


BUN  9 mg/dL (7-17)   02/22/19  05:05    


 


Creatinine  0.4 mg/dL (0.7-1.2)  L  02/22/19  05:05    


 


Estimated GFR  > 60 ml/min  02/22/19  05:05    


 


BUN/Creatinine Ratio  23 %  02/22/19  05:05    


 


Glucose  92 mg/dL ()   02/22/19  05:05    


 


Lactic Acid  1.30 mmol/L (0.7-2.0)   02/20/19  13:46    


 


Calcium  7.8 mg/dL (8.4-10.2)  L  02/22/19  05:05    


 


Total Bilirubin  0.80 mg/dL (0.1-1.2)   02/20/19  12:02    


 


AST  13 units/L (5-40)   02/20/19  12:02    


 


ALT  11 units/L (7-56)   02/20/19  12:02    


 


Alkaline Phosphatase  68 units/L ()   02/20/19  12:02    


 


C-Reactive Protein  14.90 mg/dL (0.00-1.30)  H  02/20/19  21:35    


 


Total Protein  7.1 g/dL (6.3-8.2)   02/20/19  12:02    


 


Albumin  3.7 g/dL (3.9-5)  L  02/20/19  12:02    


 


Albumin/Globulin Ratio  1.1 %  02/20/19  12:02    


 


HCG, Qual  Negative  (Negative)   02/20/19  12:02    


 


Urine Opiates Screen  Presumptive negative   02/22/19  16:33    


 


Urine Methadone Screen  Presumptive negative   02/22/19  16:33    


 


Ur Barbiturates Screen  Presumptive negative   02/22/19  16:33    


 


Ur Phencyclidine Scrn  Presumptive negative   02/22/19  16:33    


 


Ur Amphetamines Screen  Presumptive negative   02/22/19  16:33    


 


U Benzodiazepines Scrn  Presumptive negative   02/22/19  16:33    


 


Urine Cocaine Screen  Presumptive negative   02/22/19  16:33    


 


U Marijuana (THC) Screen  Presumptive negative   02/22/19  16:33    


 


Drugs of Abuse Note  Disclamer   02/22/19  16:33    


 


Hepatitis A IgM Ab  Non-reactive  (NonReactive)   02/20/19  21:34    


 


Hep Bs Antigen  Non-reactive  (Negative)   02/20/19  21:34    


 


Hep B Core IgM Ab  Non-reactive  (NonReactive)   02/20/19  21:34    


 


Hepatitis C Antibody  Non-reactive  (NonReactive)   02/20/19  21:34    


 


HIV 1&2 Antibody Rapid  Non react  (Non React)   02/22/19  05:05    


 


HIV P24 Antigen  Non react  (Non React)   02/22/19  05:05

## 2019-02-23 NOTE — CONSULTATION
History of Present Illness


Consult date: 19


Chief complaint: 





wounds





- History of present illness


History of present illness: 





43 yo F with hx of mental illness, noncompliant with followup presents to the 

hospital with multiple wounds. She states she is self mutilator and has been 

self inducing wounds on various parts of her body since she was a teenager. She 

is upset that a workup is being performed on her because she admits that she 

will never follow up to treat any of these issues. Surgery was consulted to 

evaluate the patient for possible skin biopsy to r/o pyoderma. The patient told 

me she is not agreeable to a biopsy. She denies f/c, cp, sob, n/v, abd pain, c/d

. She denies drainage from the wounds. She thinks they keep getting infected 

because she is constantly picking at them. 





Past History


Past Medical History: other (Bipolar, Schizophrenia)


Past Surgical History: , tonsillectomy


Social history: smoking


Family history: no significant family history (reviewed)





Medications and Allergies


                                    Allergies











Allergy/AdvReac Type Severity Reaction Status Date / Time


 


lamotrigine [From Lamictal] Allergy  Itching Verified 18 14:16


 


oxcarbazepine Allergy  Itching Verified 18 14:16





[From Trileptal]     











                                Home Medications











 Medication  Instructions  Recorded  Confirmed  Last Taken  Type


 


No Known Home Medications [No  19 Unknown History





Reported Home Medications]     











Active Meds: 


Active Medications





Acetaminophen (Tylenol)  650 mg PO Q4H PRN


   PRN Reason: Pain MILD(1-3)/Fever >100.5/HA


Vancomycin HCl (Vancomycin/Ns 1 Gm/250 Ml)  1 gm in 250 mls @ 166.667 mls/hr IV 

Q12H [facility]


   Last Admin: 19 01:43 Dose:  166.667 mls/hr


   Documented by: 


Ondansetron HCl (Zofran)  4 mg IV Q8H PRN


   PRN Reason: Nausea And Vomiting


Oxycodone/Acetaminophen (Percocet 5/325)  1 tab PO Q4H PRN


   PRN Reason: Pain, Moderate (4-6)


   Last Admin: 19 21:34 Dose:  1 tab


   Documented by: 


Sodium Chloride (Sodium Chloride Flush Syringe 10 Ml)  10 ml IV BID [facility]


   Last Admin: 19 21:33 Dose:  10 ml


   Documented by: 


Sodium Chloride (Sodium Chloride Flush Syringe 10 Ml)  10 ml IV PRN PRN


   PRN Reason: LINE FLUSH


Triamcinolone Acetonide (Kenalog)  1 applic TP BID [facility]


   Last Admin: 19 10:19 Dose:  Not Given


   Documented by: 











Review of Systems


All systems: negative (10 pt ROS performed and negative except for that listed 

in HPI)





Exam


                                   Vital Signs











Temp Pulse Resp BP Pulse Ox


 


 99.3 F   99 H  18   115/75   96 


 


 19 11:46  19 11:46  19 11:46  19 11:46  19 11:46











Narrative exam: 





Gen: AAOx3. anxious and tearful


ENT; no sclerlal icterus or conjunctival pallor. Scab over right eyebrow


CV: S1, S2+


Resp; even and unlabored


Ext: bilateral upper extremity dressing c/d/i. Pt does not allow further 

examination of wounds











Results





- Labs





                                 19 05:05





                                 19 05:05


                              Abnormal lab results











  19 Range/Units





  21:26 


 


LA PTT Baseline  41 H  (<=40)  sec














- Imaging


Additional studies: 





Upper extremity CT





Assessment and Plan





43 yo F with 





1. bilateral upper extremity wounds, self induced


2. cellulitis of upper extremity wounds





I reviewed wound care RN pictures of wounds as patient did not allow examination





Plan:





1. Low likelihood of pyoderma gangrenosum based on patient history. She admits 

to self inducing the wounds and has a long history of doing this. She is NOT 

agreeable to biopsy.


2. continue abx per ID


3. w/u per primary team and consultants


4. continue wound care. 





Patient may be given the option to follow up in wound care clinic as outpatient 

to monitor wounds but likely will be noncompliant. She needs mental health 

follow up to help as well.





Thank you, please call with questions.

## 2019-02-24 VITALS — DIASTOLIC BLOOD PRESSURE: 52 MMHG | SYSTOLIC BLOOD PRESSURE: 111 MMHG

## 2019-02-24 LAB
ALBUMIN SERPL-MCNC: 3.1 G/DL (ref 3.8–4.8)
GAMMA GLOB SERPL ELPH-MCNC: 0.9 G/DL (ref 0.8–1.7)

## 2019-02-24 RX ADMIN — TRIAMCINOLONE ACETONIDE SCH: 1 CREAM TOPICAL at 11:06

## 2019-02-24 RX ADMIN — OXYCODONE AND ACETAMINOPHEN PRN TAB: 5; 325 TABLET ORAL at 11:02

## 2019-02-24 RX ADMIN — POLYETHYLENE GLYCOL 3350 SCH GM: 17 POWDER, FOR SOLUTION ORAL at 11:00

## 2019-02-24 RX ADMIN — Medication SCH: at 11:06

## 2019-02-24 RX ADMIN — VANCOMYCIN HYDROCHLORIDE SCH MLS/HR: 1 INJECTION, POWDER, LYOPHILIZED, FOR SOLUTION INTRAVENOUS at 01:51

## 2019-02-24 NOTE — PROGRESS NOTE
Assessment and Plan





Cultures:


02/20/2019 blood culture: No growth





A/P:


44-year-old female with schizoaffective disorder, bipolar disorder admitted 

with:





1) Right hand cellulitis: Right hand does appear warm, swollen and tender and 

seems to be responding to IV antibiotics. Continue IV vancomycin for now.


      -ESR 38


      -CRP 14.9


      -HIV negative


      -Hepatitis B/C - negative





2) Multiple other wounds involving right eyebrow, right arm, right shoulder 

blade, left forearm: These are not warm, tender. There is no purulence or 

drainage. These do not appear to be infected. Right eyebrow wound seemingly was 

associated with eyebrow shaving hence raises the concern for possible pathergy 

which is associated with pyoderma. Agree with checking for vasculitis and 

autoimmune etiology.. Will also check SARAH, C3, C4.   Continue wound care.





Recs:


- Continue IV Vancomycin, target trough: 10-20 mcg/ml, D2


- f/u  vasculitic panel


- f/u urinary tox screen ordered


- continue wound care


-SARAH, C3 and C4 ordered


-Can be discharged home on Keflex 500mg PO QID and Doxycycline 100 mg PO q 12 

for 7 days


-f/u ID clinic 


Consider surgical consult for skin biopsy from a non-infected lesions- patient 

refused


Patient stated that she is not going to  follow up with ID clinic and is not 

going to fill her antibiotic prescription, patient teaching given, not 

receptive.








Deanna Mary NP


[facility] 


M: [phone]


O:[phone]








Subjective


Date of service: 02/24/19


Interval history: 





Patient seen and examined.  Stated that she was feeling better today and was 

ready to be discharged home.  I attempted to give patient information regarding 

follow-up at ID clinic, patient stated that she was not going to follow up or 

fill her prescriptions for outpatient antibiotics. Patient education given, no 

receptive.    





Objective





- Exam


Narrative Exam: 





Constitutional: Alert, agitated,  No acute distress


Head, Ears, Nose: Normocephalic, atraumatic. External ears, nose normal


Eyes: Conjunctivae/corneas clear. No icterus. No ptosis.


Neck: Supple, no meningeal signs


Oral: dentition fair, no thrush


Cardiovascular: S1, S2 normal. 


Respiratory: Good air entry, clear to auscultation bilaterally


GI: Soft, non-tender; bowel sounds normal. No peritoneal signs


Musculoskeletal: No pedal edema, no cyanosis.


Skin: Right eyebrow about 1.5 cm superficial wound without any purulence or 

drainage. There is no warmth or tenderness. Right arm with superficial abrasion 

on again without purulence or drainage or acute inflammation. Similar lesion 

also on the right shoulder blade area. Left forearm with superficial wound with 

clean base with no surrounding redness or tenderness. Right hand dorsal surface 

with a wound with a central eschar and surrounding redness, warmth and 

tenderness. No active drainage or purulence, no areas of fluctuation.


Hem/Lymphatic: No palpable cervical or supraclavicular nodes. No lymphangitis


Psych: Mood agitated


Neurological: Awake, alert, oriented. No gross abnormality 





- Constitutional


Vitals: 


                                   Vital Signs











Temp Pulse Resp BP Pulse Ox


 


 97.8 F   66   18   107/58   97 


 


 02/24/19 06:11  02/24/19 06:11  02/24/19 06:11  02/24/19 06:11  02/24/19 06:11








                           Temperature -Last 24 Hours











Temperature                    97.8 F


 


Temperature                    97.8 F


 


Temperature                    97.7 F


 


Temperature                    97.9 F

















- Labs


CBC & Chem 7: 


                                 02/22/19 05:05





                                 02/22/19 05:05


Labs: 


                              Abnormal lab results











  02/20/19 Range/Units





  21:26 


 


Albumin  3.1 L  (3.8-4.8)  g/dL


 


Alpha-1-Globulins  0.5 H  (0.2-0.3)  g/dL


 


PEP Interpretation  see below H

## 2019-02-24 NOTE — DISCHARGE SUMMARY
Providers





- Providers


Date of Admission: 


02/20/19 20:37





Date of discharge: 02/24/19


Attending physician: 


YAIR RITTER





                                        





02/20/19 20:07


Consult to Wound/ET Nurse [CONS] Routine 


   Reason For Exam: wound eval





02/21/19 07:29


Consult to Physician [CONS] Routine 


   Comment: 


   Consulting Provider: SALVATORE EDDY


   Physician Instructions: 


   Reason For Exam: cellulitis, ?pyoderma gangrenosum





02/23/19 10:06


Consult to Physician [CONS] Routine 


   Comment: 


   Consulting Provider: SUDEEP MCGOWAN


   Physician Instructions: 


   Reason For Exam: skin bx--?pyoderma gangrenosum











Primary care physician: 


ELLEN DUMONT








Hospitalization


Reason for admission: right arm cellulitis


Condition: Stable


Hospital course: 





43 yo F with hx of mental illness, noncompliant with followup presents to the 

hospital with multiple wounds.  The patient was noted to have a right hand wound

 with surrounding cellulitis of the forearm.  Patient received IV antibiotics 

with significant improvement.  Patient was seen by ID and surgery consultation. 

 Upon further history documented per surgery, She states she is self mutilator 

and has been self inducing wounds on various parts of her body since she was a 

teenager. She is upset that a workup is being performed on her because she 

admits that she will never follow up to treat any of these issues. Surgery was 

consulted to evaluate the patient for possible skin biopsy to r/o pyoderma. The 

patient told me she is not agreeable to a biopsy. She denies f/c, cp, sob, n/v, 

abd pain, c/d. She denies drainage from the wounds. She thinks they keep getting

 infected because she is constantly picking at them.  The patient's cellulitis 

has resolved and thus she will be discharged home.  Dedicated discharge time 32 

minutes.


Disposition: DC-01 TO HOME OR SELFCARE


Time spent for discharge: 32





- Discharge Diagnoses


(1) Bipolar disorder


Status: Acute   





(2) Cellulitis of hand, right


Status: Acute   





(3) Pyoderma gangrenosum


Status: Acute   





Core Measure Documentation





- Palliative Care


Palliative Care/ Comfort Measures: Not Applicable





- Core Measures


Any of the following diagnoses?: none





Exam





- Constitutional


Vitals: 


                                        











Temp Pulse Resp BP Pulse Ox


 


 97.8 F   66   18   107/58   97 


 


 02/24/19 06:11  02/24/19 06:11  02/24/19 06:11  02/24/19 06:11  02/24/19 06:11











General appearance: Present: no acute distress, well-nourished





- EENT


Eyes: Present: PERRL


ENT: hearing intact, clear oral mucosa





- Neck


Neck: Present: supple, normal ROM





- Respiratory


Respiratory effort: normal


Respiratory: bilateral: CTA





- Cardiovascular


Heart Sounds: Present: S1 & S2.  Absent: rub, click





- Extremities


Extremities: pulses symmetrical, No edema


Peripheral Pulses: within normal limits





- Abdominal


General gastrointestinal: Present: soft, non-tender, non-distended, normal bowel

 sounds


Female genitourinary: Present: normal





- Integumentary


Integumentary: Present: clear, warm, dry





- Musculoskeletal


Musculoskeletal: gait normal, strength equal bilaterally





- Psychiatric


Psychiatric: appropriate mood/affect, intact judgment & insight





- Neurologic


Neurologic: CNII-XII intact, moves all extremities





Plan


Activity: no restrictions


Weight Bearing Status: Full Weight Bearing


Diet: regular


Follow up with: 


PRIMARY CARE,MD [Referring] - 3-5 Days


Prescriptions: 


Amoxicillin/Potassium Clav [Augmentin 875-125 Tablet] 1 each PO BID #14 tablet


oxyCODONE /ACETAMINOPHEN [Percocet 5/325 mg] 1 tab PO Q4H PRN #12 tablet


 PRN Reason: Pain, Moderate (4-6)

## 2019-03-14 LAB
ALBUMIN SERPL-MCNC: (no result) G/DL
CREAT UR-MCNC: (no result) MG/DL
GAMMA GLOB SERPL ELPH-MCNC: (no result) G/DL
IMP & REVIEW OF LAB RESULTS: (no result)
PROT/CREAT UR: (no result) MG/G{CREAT}

## 2020-02-06 ENCOUNTER — HOSPITAL ENCOUNTER (EMERGENCY)
Dept: HOSPITAL 5 - ED | Age: 45
Discharge: HOME | End: 2020-02-06
Payer: MEDICARE

## 2020-02-06 VITALS — SYSTOLIC BLOOD PRESSURE: 110 MMHG | DIASTOLIC BLOOD PRESSURE: 60 MMHG

## 2020-02-06 DIAGNOSIS — Z90.89: ICD-10-CM

## 2020-02-06 DIAGNOSIS — R53.1: Primary | ICD-10-CM

## 2020-02-06 DIAGNOSIS — F25.0: ICD-10-CM

## 2020-02-06 DIAGNOSIS — J45.909: ICD-10-CM

## 2020-02-06 DIAGNOSIS — Z79.899: ICD-10-CM

## 2020-02-06 DIAGNOSIS — Z88.8: ICD-10-CM

## 2020-02-06 DIAGNOSIS — Z87.442: ICD-10-CM

## 2020-02-06 DIAGNOSIS — L03.113: ICD-10-CM

## 2020-02-06 DIAGNOSIS — L88: ICD-10-CM

## 2020-02-06 LAB
ALBUMIN SERPL-MCNC: 4 G/DL (ref 3.9–5)
ALT SERPL-CCNC: 24 UNITS/L (ref 7–56)
APTT BLD: 25.9 SEC. (ref 24.2–36.6)
BASOPHILS # (AUTO): 0 K/MM3 (ref 0–0.1)
BASOPHILS NFR BLD AUTO: 0.8 % (ref 0–1.8)
BENZODIAZEPINES SCREEN,URINE: (no result)
BILIRUB DIRECT SERPL-MCNC: 0.2 MG/DL (ref 0–0.2)
BILIRUB UR QL STRIP: (no result)
BLOOD UR QL VISUAL: (no result)
BUN SERPL-MCNC: 18 MG/DL (ref 7–17)
BUN/CREAT SERPL: 36 %
CALCIUM SERPL-MCNC: 8.9 MG/DL (ref 8.4–10.2)
EOSINOPHIL # BLD AUTO: 0.1 K/MM3 (ref 0–0.4)
EOSINOPHIL NFR BLD AUTO: 1.2 % (ref 0–4.3)
HCT VFR BLD CALC: 37.7 % (ref 30.3–42.9)
HEMOLYSIS INDEX: 8
HGB BLD-MCNC: 13.2 GM/DL (ref 10.1–14.3)
INR PPP: 0.95 (ref 0.87–1.13)
LYMPHOCYTES # BLD AUTO: 1 K/MM3 (ref 1.2–5.4)
LYMPHOCYTES NFR BLD AUTO: 17.9 % (ref 13.4–35)
MCHC RBC AUTO-ENTMCNC: 35 % (ref 30–34)
MCV RBC AUTO: 91 FL (ref 79–97)
METHADONE SCREEN,URINE: (no result)
MONOCYTES # (AUTO): 0.5 K/MM3 (ref 0–0.8)
MONOCYTES % (AUTO): 8.9 % (ref 0–7.3)
MUCOUS THREADS #/AREA URNS HPF: (no result) /HPF
OPIATE SCREEN,URINE: (no result)
PH UR STRIP: 5 [PH] (ref 5–7)
PLATELET # BLD: 223 K/MM3 (ref 140–440)
PROT UR STRIP-MCNC: (no result) MG/DL
RBC # BLD AUTO: 4.15 M/MM3 (ref 3.65–5.03)
RBC #/AREA URNS HPF: 27 /HPF (ref 0–6)
UROBILINOGEN UR-MCNC: 4 MG/DL (ref ?–2)
WBC #/AREA URNS HPF: 3 /HPF (ref 0–6)

## 2020-02-06 PROCEDURE — 93005 ELECTROCARDIOGRAM TRACING: CPT

## 2020-02-06 PROCEDURE — 85610 PROTHROMBIN TIME: CPT

## 2020-02-06 PROCEDURE — 82140 ASSAY OF AMMONIA: CPT

## 2020-02-06 PROCEDURE — 81001 URINALYSIS AUTO W/SCOPE: CPT

## 2020-02-06 PROCEDURE — 80048 BASIC METABOLIC PNL TOTAL CA: CPT

## 2020-02-06 PROCEDURE — 82553 CREATINE MB FRACTION: CPT

## 2020-02-06 PROCEDURE — 85730 THROMBOPLASTIN TIME PARTIAL: CPT

## 2020-02-06 PROCEDURE — 93010 ELECTROCARDIOGRAM REPORT: CPT

## 2020-02-06 PROCEDURE — 82550 ASSAY OF CK (CPK): CPT

## 2020-02-06 PROCEDURE — 80307 DRUG TEST PRSMV CHEM ANLYZR: CPT

## 2020-02-06 PROCEDURE — 80076 HEPATIC FUNCTION PANEL: CPT

## 2020-02-06 PROCEDURE — 85025 COMPLETE CBC W/AUTO DIFF WBC: CPT

## 2020-02-06 PROCEDURE — 81025 URINE PREGNANCY TEST: CPT

## 2020-02-06 PROCEDURE — 36415 COLL VENOUS BLD VENIPUNCTURE: CPT

## 2020-02-06 PROCEDURE — 83735 ASSAY OF MAGNESIUM: CPT

## 2020-02-06 PROCEDURE — 87086 URINE CULTURE/COLONY COUNT: CPT

## 2020-02-06 NOTE — EMERGENCY DEPARTMENT REPORT
ED General Adult HPI





- General


Chief complaint: Dizziness


Stated complaint: DIZZY, LUPUS


Time Seen by Provider: 02/06/20 12:36


Source: patient


Mode of arrival: Ambulatory


Limitations: No Limitations





- History of Present Illness


Initial comments: 


History-year-old female complains of weakness and periodic dizziness for the 

last 4 months.  She states that over the last 2-3 days it has been worse.  She 

denies syncope.  She denies any acute pain.  She states that she vomited 4 days 

ago.  She has some diarrhea after taking a laxative for constipation she states.

 She states that she took her temperature with a thermometer and it was normal. 

She does not complain of chest pain, abdominal pain, extremity pain.  She states

that she drove to [place] in a few weeks ago and flew back recently to take 

care of her grandmother that had a stroke.  She does not complain of any dysuria

or  symptoms.  As per previous medical records the patient has a schizo

affective disorder.  She is not on any mental health medications.





Patient admits that she has not seen a physician nor followed up with an 

infectious disease doctor after her hospitalization here in March last year.  

She was treated for pyoderma gangrenosum of the hand:





Hospitalization


Reason for admission: right arm cellulitis


Condition: Stable


Hospital course: 





43 yo F with hx of mental illness, noncompliant with followup presents to the 

hospital with multiple wounds.  The patient was noted to have a right hand wound

with surrounding cellulitis of the forearm.  Patient received IV antibiotics 

with significant improvement.  Patient was seen by ID and surgery consultation. 

Upon further history documented per surgery, She states she is self mutilator 

and has been self inducing wounds on various parts of her body since she was a 

teenager. She is upset that a workup is being performed on her because she 

admits that she will never follow up to treat any of these issues. Surgery was 

consulted to evaluate the patient for possible skin biopsy to r/o pyoderma. The 

patient told me she is not agreeable to a biopsy. She denies f/c, cp, sob, n/v, 

abd pain, c/d. She denies drainage from the wounds. She thinks they keep getting

infected because she is constantly picking at them.  The patient's cellulitis 

has resolved and thus she will be discharged home.  Dedicated discharge time 32 

minutes.


Disposition: DC-01 TO HOME OR SELFCARE


Time spent for discharge: 32





- Discharge Diagnoses


(1) Bipolar disorder


Status: Acute   





(2) Cellulitis of hand, right


Status: Acute   





(3) Pyoderma gangrenosum


Status: Acute   





-: month(s)


Severity scale (0 -10): 2


Consistency: intermittent


Improves with: none


Worsens with: none


Associated Symptoms: denies other symptoms


Treatments Prior to Arrival: none





- Related Data


                                  Previous Rx's











 Medication  Instructions  Recorded  Last Taken  Type


 


Amoxicillin/Potassium Clav 1 each PO BID #14 tablet 02/24/19 Unknown Rx





[Augmentin 875-125 Tablet]    


 


DOXYCYCLINE Hyclate [Vibramycin 100 mg PO Q12HR 7 Days #14 capsule 02/24/19 

Unknown Rx





CAP]    


 


oxyCODONE /ACETAMINOPHEN [Percocet 1 tab PO Q4H PRN #12 tablet 02/24/19 Unknown 

Rx





5/325 mg]    


 


Sulfamethoxazole/Trimethoprim 1 each PO BID #14 tablet 02/06/20 Unknown Rx





[Bactrim DS TAB]    











                                    Allergies











Allergy/AdvReac Type Severity Reaction Status Date / Time


 


lamotrigine [From Lamictal] Allergy  Itching Verified 02/06/20 10:16


 


oxcarbazepine Allergy  Itching Verified 02/06/20 10:16





[From Trileptal]     














ED Review of Systems


ROS: 


Stated complaint: DIZZY, LUPUS


Other details as noted in HPI





Constitutional: denies: chills, fever


Eyes: denies: eye pain, eye discharge, vision change


ENT: denies: ear pain, throat pain


Respiratory: denies: cough, shortness of breath, wheezing


Cardiovascular: denies: chest pain, palpitations


Endocrine: no symptoms reported


Gastrointestinal: denies: abdominal pain, nausea, diarrhea


Genitourinary: denies: urgency, dysuria, discharge


Musculoskeletal: denies: back pain, joint swelling, arthralgia


Skin: as per HPI (states lesion on her knee chronically.  There is an abraded 

area of her face due to scratching).  denies: rash, lesions


Neurological: paresthesias (bottom of both feet).  denies: headache, weakness, 

numbness, abnormal gait


Psychiatric: denies: anxiety, depression


Hematological/Lymphatic: denies: easy bleeding, easy bruising





ED Past Medical Hx





- Past Medical History


Hx Kidney Stones: Yes (Lithotripsy)


Hx Psychiatric Treatment: Yes (Bi polar, schizo affective disorder, PTSD.)


Hx Asthma: Yes (Bronchial Asthma)





- Surgical History


Additional Surgical History: C section, tonsilectomy





- Social History


Smoking Status: Never Smoker


Substance Use Type: None





- Medications


Home Medications: 


                                Home Medications











 Medication  Instructions  Recorded  Confirmed  Last Taken  Type


 


Amoxicillin/Potassium Clav 1 each PO BID #14 tablet 02/24/19  Unknown Rx





[Augmentin 875-125 Tablet]     


 


DOXYCYCLINE Hyclate [Vibramycin 100 mg PO Q12HR 7 Days #14 capsule 02/24/19  

Unknown Rx





CAP]     


 


oxyCODONE /ACETAMINOPHEN [Percocet 1 tab PO Q4H PRN #12 tablet 02/24/19  Unknown

 Rx





5/325 mg]     


 


Sulfamethoxazole/Trimethoprim 1 each PO BID #14 tablet 02/06/20  Unknown Rx





[Bactrim DS TAB]     














ED Physical Exam





- General


Limitations: No Limitations


General appearance: alert, in no apparent distress





- Head


Head exam: Present: atraumatic, normocephalic





- Eye


Eye exam: Present: normal appearance.  Absent: scleral icterus





- ENT


ENT exam: Present: mucous membranes moist, other (there is an abraded area of 

the face which is a bit erythematous.  Depth is to dermis only.)





- Neck


Neck exam: Present: normal inspection.  Absent: tenderness, meningismus





- Respiratory


Respiratory exam: Present: normal lung sounds bilaterally.  Absent: respiratory 

distress





- Cardiovascular


Cardiovascular Exam: Present: regular rate, normal rhythm.  Absent: systolic 

murmur, diastolic murmur, rubs, gallop





- GI/Abdominal


GI/Abdominal exam: Present: soft, normal bowel sounds.  Absent: distended, 

tenderness, guarding, rebound, rigid





- Extremities Exam


Extremities exam: Present: normal inspection, normal capillary refill.  Absent: 

pedal edema, joint swelling, calf tenderness





- Back Exam


Back exam: Present: normal inspection.  Absent: CVA tenderness (R), CVA 

tenderness (L)





- Neurological Exam


Neurological exam: Present: alert, oriented X3, CN II-XII intact.  Absent: motor

sensory deficit





- Psychiatric


Psychiatric exam: Present: normal affect, normal mood





- Skin


Skin exam: Present: warm, dry, intact, normal color.  Absent: rash





ED Course


                                   Vital Signs











  02/06/20 02/06/20





  10:17 11:31


 


Temperature 97.4 F L 98.2 F


 


Pulse Rate 87 74


 


Respiratory 18 23





Rate  


 


Blood Pressure 115/54 101/51


 


Blood Pressure  101/51





[Right]  


 


O2 Sat by Pulse 97 100





Oximetry  














ED Medical Decision Making





- Lab Data


Result diagrams: 


                                 02/06/20 13:14





                                 02/06/20 13:14








                         Laboratory Results - last 24 hr











  02/06/20 02/06/20 02/06/20





  13:14 13:14 13:14


 


WBC  5.6  


 


RBC  4.15  


 


Hgb  13.2  


 


Hct  37.7  


 


MCV  91  


 


MCH  32  


 


MCHC  35 H  


 


RDW  13.4  


 


Plt Count  223  


 


Lymph % (Auto)  17.9  


 


Mono % (Auto)  8.9 H  


 


Eos % (Auto)  1.2  


 


Baso % (Auto)  0.8  


 


Lymph #  1.0 L  


 


Mono #  0.5  


 


Eos #  0.1  


 


Baso #  0.0  


 


Seg Neutrophils %  71.2 H  


 


Seg Neutrophils #  4.0  


 


PT    12.8


 


INR    0.95


 


APTT    25.9


 


Sodium   141 


 


Potassium   3.8 


 


Chloride   105.3 


 


Carbon Dioxide   23 


 


Anion Gap   17 


 


BUN   18 H 


 


Creatinine   0.5 L 


 


Estimated GFR   > 60 


 


BUN/Creatinine Ratio   36 


 


Glucose   107 H 


 


Lactic Acid   


 


Calcium   8.9 


 


Magnesium   2.10 


 


Total Bilirubin   0.80 


 


Direct Bilirubin   0.2 


 


Indirect Bilirubin   0.6 


 


AST   27 


 


ALT   24 


 


Alkaline Phosphatase   77 


 


Total Creatine Kinase   


 


CK-MB (CK-2)   


 


CK-MB (CK-2) Rel Index   


 


Total Protein   7.1 


 


Albumin   4.0 


 


Albumin/Globulin Ratio   1.3 














  02/06/20 02/06/20





  13:14 13:14


 


WBC  


 


RBC  


 


Hgb  


 


Hct  


 


MCV  


 


MCH  


 


MCHC  


 


RDW  


 


Plt Count  


 


Lymph % (Auto)  


 


Mono % (Auto)  


 


Eos % (Auto)  


 


Baso % (Auto)  


 


Lymph #  


 


Mono #  


 


Eos #  


 


Baso #  


 


Seg Neutrophils %  


 


Seg Neutrophils #  


 


PT  


 


INR  


 


APTT  


 


Sodium  


 


Potassium  


 


Chloride  


 


Carbon Dioxide  


 


Anion Gap  


 


BUN  


 


Creatinine  


 


Estimated GFR  


 


BUN/Creatinine Ratio  


 


Glucose  


 


Lactic Acid  0.80 


 


Calcium  


 


Magnesium  


 


Total Bilirubin  


 


Direct Bilirubin  


 


Indirect Bilirubin  


 


AST  


 


ALT  


 


Alkaline Phosphatase  


 


Total Creatine Kinase   199 H


 


CK-MB (CK-2)   3.4


 


CK-MB (CK-2) Rel Index   1.7


 


Total Protein  


 


Albumin  


 


Albumin/Globulin Ratio  











                         Laboratory Results - last 24 hr











  02/06/20 02/06/20 02/06/20





  13:14 13:14 13:14


 


WBC  5.6  


 


RBC  4.15  


 


Hgb  13.2  


 


Hct  37.7  


 


MCV  91  


 


MCH  32  


 


MCHC  35 H  


 


RDW  13.4  


 


Plt Count  223  


 


Lymph % (Auto)  17.9  


 


Mono % (Auto)  8.9 H  


 


Eos % (Auto)  1.2  


 


Baso % (Auto)  0.8  


 


Lymph #  1.0 L  


 


Mono #  0.5  


 


Eos #  0.1  


 


Baso #  0.0  


 


Seg Neutrophils %  71.2 H  


 


Seg Neutrophils #  4.0  


 


PT    12.8


 


INR    0.95


 


APTT    25.9


 


Sodium   141 


 


Potassium   3.8 


 


Chloride   105.3 


 


Carbon Dioxide   23 


 


Anion Gap   17 


 


BUN   18 H 


 


Creatinine   0.5 L 


 


Estimated GFR   > 60 


 


BUN/Creatinine Ratio   36 


 


Glucose   107 H 


 


Lactic Acid   


 


Calcium   8.9 


 


Magnesium   2.10 


 


Total Bilirubin   0.80 


 


Direct Bilirubin   0.2 


 


Indirect Bilirubin   0.6 


 


AST   27 


 


ALT   24 


 


Alkaline Phosphatase   77 


 


Total Creatine Kinase   


 


CK-MB (CK-2)   


 


CK-MB (CK-2) Rel Index   


 


Total Protein   7.1 


 


Albumin   4.0 


 


Albumin/Globulin Ratio   1.3 


 


Urine Color   


 


Urine Turbidity   


 


Urine pH   


 


Ur Specific Gravity   


 


Urine Protein   


 


Urine Glucose (UA)   


 


Urine Ketones   


 


Urine Blood   


 


Urine Nitrite   


 


Ur Reducing Substances   


 


Urine Bilirubin   


 


Urine Ictotest   


 


Urine Urobilinogen   


 


Ur Leukocyte Esterase   


 


Urine WBC (Auto)   


 


Urine RBC (Auto)   


 


U Epithel Cells (Auto)   


 


Urine Mucus   


 


Urine HCG, Qual   














  02/06/20 02/06/20 02/06/20





  13:14 13:14 13:59


 


WBC   


 


RBC   


 


Hgb   


 


Hct   


 


MCV   


 


MCH   


 


MCHC   


 


RDW   


 


Plt Count   


 


Lymph % (Auto)   


 


Mono % (Auto)   


 


Eos % (Auto)   


 


Baso % (Auto)   


 


Lymph #   


 


Mono #   


 


Eos #   


 


Baso #   


 


Seg Neutrophils %   


 


Seg Neutrophils #   


 


PT   


 


INR   


 


APTT   


 


Sodium   


 


Potassium   


 


Chloride   


 


Carbon Dioxide   


 


Anion Gap   


 


BUN   


 


Creatinine   


 


Estimated GFR   


 


BUN/Creatinine Ratio   


 


Glucose   


 


Lactic Acid  0.80  


 


Calcium   


 


Magnesium   


 


Total Bilirubin   


 


Direct Bilirubin   


 


Indirect Bilirubin   


 


AST   


 


ALT   


 


Alkaline Phosphatase   


 


Total Creatine Kinase   199 H 


 


CK-MB (CK-2)   3.4 


 


CK-MB (CK-2) Rel Index   1.7 


 


Total Protein   


 


Albumin   


 


Albumin/Globulin Ratio   


 


Urine Color    Sabra


 


Urine Turbidity    Slightly-cloudy


 


Urine pH    5.0


 


Ur Specific Gravity    1.032 H


 


Urine Protein    <15 mg/dl


 


Urine Glucose (UA)    Neg


 


Urine Ketones    20


 


Urine Blood    Sm


 


Urine Nitrite    Neg


 


Ur Reducing Substances    Not Reportable


 


Urine Bilirubin    Neg


 


Urine Ictotest    Not Reportable


 


Urine Urobilinogen    4.0


 


Ur Leukocyte Esterase    Tr


 


Urine WBC (Auto)    3.0


 


Urine RBC (Auto)    27.0


 


U Epithel Cells (Auto)    7.0


 


Urine Mucus    3+


 


Urine HCG, Qual    Negative














- EKG Data


-: EKG Interpreted by Me


EKG shows normal: sinus rhythm, axis, intervals, QRS complexes, ST-T waves


Rate: normal





- EKG Data


Interpretation: normal EKG


Critical care attestation.: 


If time is entered above; I have spent that time in minutes in the direct care 

of this critically ill patient, excluding procedure time.








ED Disposition


Clinical Impression: 


 Weakness, Wound infection





Disposition: DC-01 TO HOME OR SELFCARE


Is pt being admited?: No


Does the pt Need Aspirin: No


Condition: Stable


Instructions:  Wound Infection (ED), Weakness (ED)


Additional Instructions: 


Return to the emergency department any acute change or problem.  Follow-up with 

[facility] clinic.  Rx Bactrim.  Urine culture tests will be available in

 2-3 days.  At this point it does not look like a urinary tract infection.


Prescriptions: 


Sulfamethoxazole/Trimethoprim [Bactrim DS TAB] 1 each PO BID #14 tablet


Referrals: 


PRIMARY CARE,MD [Primary Care Provider] - 3-5 Days


Time of Disposition: 14:42

## 2020-10-17 ENCOUNTER — HOSPITAL ENCOUNTER (EMERGENCY)
Dept: HOSPITAL 5 - ED | Age: 45
LOS: 1 days | Discharge: HOME | End: 2020-10-18
Payer: MEDICARE

## 2020-10-17 DIAGNOSIS — F31.9: ICD-10-CM

## 2020-10-17 DIAGNOSIS — Z79.899: ICD-10-CM

## 2020-10-17 DIAGNOSIS — Z87.442: ICD-10-CM

## 2020-10-17 DIAGNOSIS — J45.909: ICD-10-CM

## 2020-10-17 DIAGNOSIS — Z88.8: ICD-10-CM

## 2020-10-17 DIAGNOSIS — R45.851: Primary | ICD-10-CM

## 2020-10-17 DIAGNOSIS — Z79.2: ICD-10-CM

## 2020-10-17 DIAGNOSIS — F14.10: ICD-10-CM

## 2020-10-17 DIAGNOSIS — F12.10: ICD-10-CM

## 2020-10-17 DIAGNOSIS — Z98.890: ICD-10-CM

## 2020-10-17 DIAGNOSIS — F43.10: ICD-10-CM

## 2020-10-17 DIAGNOSIS — Z90.89: ICD-10-CM

## 2020-10-17 LAB
BASOPHILS # (AUTO): 0.1 K/MM3 (ref 0–0.1)
BASOPHILS NFR BLD AUTO: 1.6 % (ref 0–1.8)
BENZODIAZEPINES SCREEN,URINE: (no result)
BILIRUB UR QL STRIP: (no result)
BLOOD UR QL VISUAL: (no result)
BUN SERPL-MCNC: 8 MG/DL (ref 7–17)
BUN/CREAT SERPL: 13 %
CALCIUM SERPL-MCNC: 8.9 MG/DL (ref 8.4–10.2)
EOSINOPHIL # BLD AUTO: 0 K/MM3 (ref 0–0.4)
EOSINOPHIL NFR BLD AUTO: 0.7 % (ref 0–4.3)
HCT VFR BLD CALC: 38.4 % (ref 30.3–42.9)
HEMOLYSIS INDEX: 5
HGB BLD-MCNC: 13.5 GM/DL (ref 10.1–14.3)
LYMPHOCYTES # BLD AUTO: 1.9 K/MM3 (ref 1.2–5.4)
LYMPHOCYTES NFR BLD AUTO: 27.4 % (ref 13.4–35)
MCHC RBC AUTO-ENTMCNC: 35 % (ref 30–34)
MCV RBC AUTO: 93 FL (ref 79–97)
METHADONE SCREEN,URINE: (no result)
MONOCYTES # (AUTO): 0.8 K/MM3 (ref 0–0.8)
MONOCYTES % (AUTO): 11.1 % (ref 0–7.3)
MUCOUS THREADS #/AREA URNS HPF: (no result) /HPF
OPIATE SCREEN,URINE: (no result)
PH UR STRIP: 6 [PH] (ref 5–7)
PLATELET # BLD: 302 K/MM3 (ref 140–440)
PROT UR STRIP-MCNC: (no result) MG/DL
RBC # BLD AUTO: 4.14 M/MM3 (ref 3.65–5.03)
RBC #/AREA URNS HPF: 3 /HPF (ref 0–6)
UROBILINOGEN UR-MCNC: < 2 MG/DL (ref ?–2)
WBC #/AREA URNS HPF: < 1 /HPF (ref 0–6)

## 2020-10-17 PROCEDURE — 85025 COMPLETE CBC W/AUTO DIFF WBC: CPT

## 2020-10-17 PROCEDURE — 84703 CHORIONIC GONADOTROPIN ASSAY: CPT

## 2020-10-17 PROCEDURE — 80320 DRUG SCREEN QUANTALCOHOLS: CPT

## 2020-10-17 PROCEDURE — 80048 BASIC METABOLIC PNL TOTAL CA: CPT

## 2020-10-17 PROCEDURE — 80307 DRUG TEST PRSMV CHEM ANLYZR: CPT

## 2020-10-17 PROCEDURE — G0480 DRUG TEST DEF 1-7 CLASSES: HCPCS

## 2020-10-17 PROCEDURE — 81001 URINALYSIS AUTO W/SCOPE: CPT

## 2020-10-17 PROCEDURE — 36415 COLL VENOUS BLD VENIPUNCTURE: CPT
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
## 2020-10-18 VITALS — DIASTOLIC BLOOD PRESSURE: 74 MMHG | SYSTOLIC BLOOD PRESSURE: 125 MMHG
